# Patient Record
Sex: MALE
[De-identification: names, ages, dates, MRNs, and addresses within clinical notes are randomized per-mention and may not be internally consistent; named-entity substitution may affect disease eponyms.]

---

## 2021-11-30 PROBLEM — Z00.00 ENCOUNTER FOR PREVENTIVE HEALTH EXAMINATION: Status: ACTIVE | Noted: 2021-11-30

## 2021-12-06 ENCOUNTER — APPOINTMENT (OUTPATIENT)
Dept: COLORECTAL SURGERY | Facility: CLINIC | Age: 36
End: 2021-12-06
Payer: COMMERCIAL

## 2021-12-06 ENCOUNTER — NON-APPOINTMENT (OUTPATIENT)
Age: 36
End: 2021-12-06

## 2021-12-06 VITALS
SYSTOLIC BLOOD PRESSURE: 104 MMHG | WEIGHT: 216.25 LBS | HEART RATE: 69 BPM | HEIGHT: 69 IN | BODY MASS INDEX: 32.03 KG/M2 | DIASTOLIC BLOOD PRESSURE: 73 MMHG | TEMPERATURE: 97.3 F | OXYGEN SATURATION: 97 %

## 2021-12-06 DIAGNOSIS — Z78.9 OTHER SPECIFIED HEALTH STATUS: ICD-10-CM

## 2021-12-06 DIAGNOSIS — K64.9 UNSPECIFIED HEMORRHOIDS: ICD-10-CM

## 2021-12-06 DIAGNOSIS — K64.5 PERIANAL VENOUS THROMBOSIS: ICD-10-CM

## 2021-12-06 PROCEDURE — 46600 DIAGNOSTIC ANOSCOPY SPX: CPT

## 2021-12-06 PROCEDURE — 99203 OFFICE O/P NEW LOW 30 MIN: CPT | Mod: 25

## 2021-12-06 RX ORDER — HYDROCORTISONE 25 MG/G
2.5 CREAM TOPICAL 3 TIMES DAILY
Qty: 1 | Refills: 3 | Status: ACTIVE | COMMUNITY
Start: 2021-12-06 | End: 1900-01-01

## 2021-12-06 RX ORDER — BUPROPION HYDROCHLORIDE 150 MG/1
150 TABLET, FILM COATED ORAL
Refills: 0 | Status: ACTIVE | COMMUNITY

## 2021-12-06 NOTE — HISTORY OF PRESENT ILLNESS
[FreeTextEntry1] : 35yo male presents for initial evalution\par Reason for visit "hemorrhoid"\par \par Patient recently joined [employer].\par 2 weeks ago patient was working out, after doing squats and weight lifting, patient felt discomfort.\par Next morning he felt a lump on perianal region.\par He began using OTC preparation H and doing warm baths 3x/day and since then he has noticed it to decrease in size by >50%, but has not fully gone away.\par No longer having discomfort. Never felt pain.\par Saw his PCP who gave patient referral.\par Denies prescription hemorrhoid medication use.\par Denies BRBPR or itching.\par \par Moves bowels daily\par Denies diarrhea or constipation\par He had changed his toilet behaviors and no longer sits for long periods of time on toilet\par \par Never had a colonoscopy\par Denies family history of CRC or IBD\par \par H/o depression, started on wellbutrin 6 months ago\par \par Denies ASA or anticoagulation.

## 2021-12-06 NOTE — ASSESSMENT
[FreeTextEntry1] : Exam findings and diagnosis were discussed at length with patient. \par Recommendations including increased fiber intake, adequate daily hydration, stool softeners as needed, and sitz baths as needed and after bowel movements were discussed.\par Avoid pushing/straining, intense physical exertion. Exercise modifications discussed.\par Medical management, such as hydrocortisone cream, was discussed.\par Natural history of thrombosed hemorrhoids discussed with patient. \par Given persistent symptoms with conservative measures, role of office based excision discussed.\par Symptoms have improved on preparation H, but not fully resolved. Patient states he is flying to [place] in 4 days and returning 12/29/2021. Recommend continued supportive care given anticipated post-procedural recovery and patient's planned upcoming travel. Rx for hydrocortisone provided and continue sitz baths. If symptoms persist, recommend f/u upon return to [place] for reevaluation and further management.\par All questions answered, patient expressed understanding and is agreeable to this plan.\par

## 2021-12-06 NOTE — PHYSICAL EXAM
[FreeTextEntry1] : Medical assistant present for duration of physical examination\par \par General no acute distress, alert and oriented\par Psych calm, pleasant demeanor, responding appropriately to questions\par Nonlabored breathing\par Ambulating without assistance\par Skin normal color and pigment, no visible lesions or rashes\par \par Anorectal Exam:\par Inspection no erythema, induration or fluctuance, no skin excoriation, no fissure, left lateral thrombosed external hemorrhoid (approx size of a marble),nontender, no overlying skin changes\par GOYO nontender, no masses palpated, no blood on gloved finger\par \par Procedure: Anoscopy\par \par Pre procedure Diagnosis: thrombosed external hemorrhoid\par Post procedure Diagnosis: thrombosed external hemorrhoid\par Anesthesia: none\par Estimated blood loss: none\par Specimen: none\par Complications: none\par \par Consent obtained. Anoscopy was performed by passing a lighted anoscope with lubricant jelly into the anal canal and the entire anal mucosal surface was inspected. Findings included no fissure, no internal hemorrhoidal inflammation or thrombosis, no visible masses or lesions in anal canal\par \par Patient tolerated examination and procedure well.\par \par \par

## 2022-01-24 ENCOUNTER — APPOINTMENT (OUTPATIENT)
Dept: COLORECTAL SURGERY | Facility: CLINIC | Age: 37
End: 2022-01-24

## 2023-06-13 ENCOUNTER — RESULT REVIEW (OUTPATIENT)
Age: 38
End: 2023-06-13

## 2023-06-13 ENCOUNTER — OUTPATIENT (OUTPATIENT)
Dept: OUTPATIENT SERVICES | Facility: HOSPITAL | Age: 38
LOS: 1 days | End: 2023-06-13
Payer: COMMERCIAL

## 2023-06-13 ENCOUNTER — APPOINTMENT (OUTPATIENT)
Dept: RADIOLOGY | Facility: CLINIC | Age: 38
End: 2023-06-13

## 2023-06-13 PROCEDURE — 73140 X-RAY EXAM OF FINGER(S): CPT | Mod: 26,RT
